# Patient Record
(demographics unavailable — no encounter records)

---

## 2025-01-24 NOTE — IMPRESSION
[FreeTextEntry1] : MNG with nodules as noted above.  The two left mid pole nodules not3ed prior now appear s one TR-3 nodule with other nodules TR-2 and thus no intervention in needed at this time.  F/u US in one year.

## 2025-01-24 NOTE — PROCEDURE
[Mydish e 2008 model, 10-12 MHz frequencies] : multiple real time longitudinal and transverse images were obtained using a high resolution ultrasound with a linear transducer, Mydish e 2008 model, 10-12 MHz frequencies. All measurements will be reported as longitudinal x sanaz-posterior x transverse. [Thyroid Nodule] : thyroid nodule [] : a heterogeneous parenchyma [Isoechoic] : isoechoic nodule [No calcification] : no calcification [Lower] : lower pole there is a  [Thin dirupted] : has a thin disrupted halo [Right Thyroid] : right [Solid] : solid [Isoechoic w/ hypoechoic foci] : isoechoic, with an internal hypoechoic foci nodule [Macrocalcifications] : macrocalcifications [Peripheral vascularity] : peripheral vascularity [Left Thyroid] : left [Mid] : mid pole there is a  [Mixed] : mixed [Heterogeneous] : heterogenous nodule [Round] : round in shape [Regular] : regular [No] : does not have a halo [No calcifications] : no calcifications [Peripheral and scant  internal vascularity] : peripheral and scant internal vascularity [FreeTextEntry1] : 2.82 x 1.43 x 2.19 [FreeTextEntry5] : 2.71 x 0.95 x 2.09 [FreeTextEntry2] : 0.25 [FreeTextEntry3] : 1.00 x 0.94 x 1.01

## 2025-01-27 NOTE — ADDENDUM
[FreeTextEntry1] : Blood will be drawn in office today.  This note was written by France Prince on 01/24/2025 acting as medical scribe for Dr. Edward Chavez. I, Dr. Edward Chavez, have read and attest that all the information, medical decision making and discharge instructions within are true and accurate.

## 2025-01-27 NOTE — HISTORY OF PRESENT ILLNESS
[FreeTextEntry1] : Ms. ARROYO is a 48-year-old female who returns today for follow-up of nodular thyroid and hyperthyroidism. She did have FNA carried out of an approximate 1.5 cm right upper pole thyroid nodule on 9/29/2020. FNA results were c/w [place] ll benign nodular goiter.  On no thyroid medication-feels well  Suppressed TSH in the past -felt to be c/w subacute thyroiditis. TFT's afterwards have remained normal.   Latest TFTs stable wnl in July 2024 TSH: 1.59 Free T3: 2.64 Free T4: 1.3  She continues to deny any overt hyperthyroid s/s. Never had noted any anterior neck pain. Denies neck pain, fullness or dysphagia.  Last thyroid US 01/05/2024 c/w Heterogenous thyroid bilaterally with sub-centimeter nodularity noted in right and left lobes-all felt to be non-high risk.  Denies frequent headaches. ____________________________________________________________________________________________________ Does have hx of underlying breast Ca-following with Dr. Sanches-on tamoxifen.  Too started noticing hair thinning and falling out started in October 2022. She had COVID in June 2022.   Tried Nutrafol in the past  Menses' irregular after chemo and tamoxifen use. Has IUD On Tamoxifen for about 4.5 years  Re her breast Ca hx, she is s/p double mastectomy- had chemo now on tamoxifen  Is off all vitamin d3 daily. Takes regular multi vit and collagen.

## 2025-01-27 NOTE — HISTORY OF PRESENT ILLNESS
[FreeTextEntry1] : Ms. ARROYO is a 48-year-old female who returns today for follow-up of nodular thyroid and hyperthyroidism. She did have FNA carried out of an approximate 1.5 cm right upper pole thyroid nodule on 9/29/2020. FNA results were c/w [place] ll benign nodular goiter.  On no thyroid medication-feels well  Suppressed TSH in the past -felt to be c/w subacute thyroiditis. TFT's afterwards have remained normal.   Latest TFTs stable wnl in July 2024 TSH: 1.59 Free T3: 2.64 Free T4: 1.3  She continues to deny any overt hyperthyroid s/s. Never had noted any anterior neck pain. Denies neck pain, fullness or dysphagia.  Last thyroid US 01/05/2024 c/w Heterogenous thyroid bilaterally with sub-centimeter nodularity noted in right and left lobes-all felt to be non-high risk.  Denies frequent headaches. ____________________________________________________________________________________________________ Does have hx of underlying breast Ca-following with Dr. Sanches-on tamoxifen.  Too started noticing hair thinning and falling out started in October 2022. She had COVID in June 2022.   Tried Nutrafol in the past  Menses' irregular after chemo and tamoxifen use. Has IUD On Tamoxifen for about 4.5 years  Re her breast Ca hx, she is s/p double mastectomy- had chemo now on tamoxifen  Is off all vitamin d3 daily. Takes regular multi vit and collagen.

## 2025-01-27 NOTE — HISTORY OF PRESENT ILLNESS
[FreeTextEntry1] : Ms. ARROYO is a 48-year-old female who returns today for follow-up of nodular thyroid and hyperthyroidism. She did have FNA carried out of an approximate 1.5 cm right upper pole thyroid nodule on 9/29/2020. FNA results were c/w [place] ll benign nodular goiter.  On no thyroid medication-feels well  Suppressed TSH in the past -felt to be c/w subacute thyroiditis. TFT's afterwards have remained normal.   Latest TFTs stable wnl in July 2024 TSH: 1.59 Free T3: 2.64 Free T4: 1.3  She continues to deny any overt hyperthyroid s/s. Never had noted any anterior neck pain. Denies neck pain, fullness or dysphagia.  Last thyroid US 01/05/2024 c/w Heterogenous thyroid bilaterally with sub-centimeter nodularity noted in right and left lobes-all felt to be non-high risk.  Denies frequent headaches. ____________________________________________________________________________________________________ Does have hx of underlying breast Ca-following with Dr. Sanches-on tamoxifen.  Too started noticing hair thinning and falling out started in October 2022. She had COVID in June 2022.   Tried Nutrafol in the past  Menses' irregular after chemo and tamoxifen use. Has IUD On Tamoxifen for about 4.5 years  Re her breast Ca hx, she is s/p double mastectomy- had chemo now on tamoxifen  Is off all vitamin d3 daily. Takes regular multi vit and collagen.